# Patient Record
Sex: FEMALE | Race: WHITE | NOT HISPANIC OR LATINO | URBAN - METROPOLITAN AREA
[De-identification: names, ages, dates, MRNs, and addresses within clinical notes are randomized per-mention and may not be internally consistent; named-entity substitution may affect disease eponyms.]

---

## 2017-05-23 ENCOUNTER — IMPORTED ENCOUNTER (OUTPATIENT)
Dept: URBAN - METROPOLITAN AREA CLINIC 31 | Facility: CLINIC | Age: 72
End: 2017-05-23

## 2017-05-23 PROBLEM — H25.813: Noted: 2017-05-23

## 2017-05-23 PROCEDURE — 92004 COMPRE OPH EXAM NEW PT 1/>: CPT

## 2017-05-23 PROCEDURE — 92015 DETERMINE REFRACTIVE STATE: CPT

## 2017-05-23 NOTE — PATIENT DISCUSSION
1.  Cataract consult with Dr. Kwadwo Pettit. Advised to stop RGP wear 4 weeks before cataract evaluation and admit with Dr. Kwadwo Pettit. Has successfully worn multifocal contact lenses and may be good candidate for multifocal IOLs. 2. Refractive error - Good fit with present MF CLs but unable to increase VA with over refraction due to significant cataract changes. 3. Return for an appointment in 2 months for cataract evaluation. with Dr. Raoul Marcelo.

## 2017-06-21 ENCOUNTER — IMPORTED ENCOUNTER (OUTPATIENT)
Dept: URBAN - METROPOLITAN AREA CLINIC 31 | Facility: CLINIC | Age: 72
End: 2017-06-21

## 2017-06-21 PROBLEM — H25.13: Noted: 2017-06-21

## 2017-06-21 PROCEDURE — 99214 OFFICE O/P EST MOD 30 MIN: CPT

## 2017-06-21 NOTE — PATIENT DISCUSSION
Discussed the risks benefits alternatives and limitations of cataract surgery including infection bleeding loss of vision retinal tears detachment. The patient stated a full understanding and a desire to proceed with the procedure in both eyes. Refractive options were reviewed. Pt has elected MFIOL with Femto assist and ORA guidance to optimize lens power choice. The pt may still need glasses to possibly fine tune uncorrected vision. The possibility of glare and halo were reviewed with patient. RGP MFCL user. DC CL for 3 weeks prior to admit. Marcos admit to make sure molding has resolved. May need toric MFIOL Schedule KPE/IOL OD/OS discussed density of lens and po edema.

## 2017-07-19 ENCOUNTER — IMPORTED ENCOUNTER (OUTPATIENT)
Dept: URBAN - METROPOLITAN AREA CLINIC 31 | Facility: CLINIC | Age: 72
End: 2017-07-19

## 2017-07-19 PROBLEM — H25.13: Noted: 2017-07-19

## 2017-07-19 PROCEDURE — 76519 ECHO EXAM OF EYE: CPT

## 2017-07-19 PROCEDURE — 92134 CPTRZ OPH DX IMG PST SGM RTA: CPT

## 2017-07-19 PROCEDURE — 92286 ANT SGM IMG I&R SPECLR MIC: CPT

## 2017-07-19 NOTE — PATIENT DISCUSSION
Discussed the risks benefits alternatives and limitations of cataract surgery including infection bleeding loss of vision retinal tears detachment. The patient stated a full understanding and a desire to proceed with the procedure in both eyes. Refractive options were reviewed. Pt has elected MFIOL with Femto assist and ORA guidance to optimize lens power choice. The pt may still need glasses to possibly fine tune uncorrected vision. The possibilty of glare and halo were reviewed with patient.

## 2017-08-01 ENCOUNTER — IMPORTED ENCOUNTER (OUTPATIENT)
Dept: URBAN - METROPOLITAN AREA CLINIC 31 | Facility: CLINIC | Age: 72
End: 2017-08-01

## 2017-08-01 PROBLEM — Z96.1: Noted: 2017-08-01

## 2017-08-01 PROCEDURE — 99024 POSTOP FOLLOW-UP VISIT: CPT

## 2017-08-01 NOTE — PATIENT DISCUSSION
Post-Op Day #1 - Cataract Surgery Right Eye (OD) - ReStor with CRI explained the issue with ordering Toric implant with  representative not bringing implant to surgery center and unavailable at any local center. Pt had 1D of astigmatism that typically is treated with CRI as well as with toric. I elected to proceed with laser CRI and standard ReStor implant. Tears prn. Continue postop drops as directed. Call office with symptoms of pain redness or decreased vision in operative eye. 1 drop zylet instilled. Pt has moderate edema OD will recheck on Friday if edema has not cleared fully central would hold on surgery OS for a week. Return for an appointment in 3 days for post op exam. with Dr. Camilla Crigler.

## 2017-08-08 ENCOUNTER — IMPORTED ENCOUNTER (OUTPATIENT)
Dept: URBAN - METROPOLITAN AREA CLINIC 31 | Facility: CLINIC | Age: 72
End: 2017-08-08

## 2017-08-08 PROBLEM — Z96.1: Noted: 2017-08-08

## 2017-08-08 PROCEDURE — 99024 POSTOP FOLLOW-UP VISIT: CPT

## 2017-08-08 NOTE — PATIENT DISCUSSION
1.  Post-Op Week #1 - Cataract Surgery Right Eye (OD) - Mild edema and surface affecting uncorrected vision. Finish postop drops as directed. Reassured pt should clear over next 1-2 weeks. 2. Post-Op Day #1 - Cataract Surgery Left Eye (OS) - Elevated IOP pt with pressure feeling. Tears prn. Continue postop drops as directed. Call office with symptoms of pain redness or decreased vision in operative eye. Aqueous released paracentesis. Besivance instilled. Add lumigan qhs for a week. Discussed probable effect of using Viscoat to protect cornea with OS3. Return for an appointment in 1 week for post op refraction. with Dr. Dylan Jones.

## 2017-08-15 ENCOUNTER — IMPORTED ENCOUNTER (OUTPATIENT)
Dept: URBAN - METROPOLITAN AREA CLINIC 31 | Facility: CLINIC | Age: 72
End: 2017-08-15

## 2017-08-15 PROBLEM — Z96.1: Noted: 2017-08-15

## 2017-08-15 PROCEDURE — 99024 POSTOP FOLLOW-UP VISIT: CPT

## 2017-08-15 NOTE — PATIENT DISCUSSION
1.  Post-Op Week #2 -  Cataract Surgery Right Eye (OD) - Intraocular lens stable and surgery very well healed. Patient to resume all normal activities. Finish postop drops as directed. Final Refraction given if necessary. 2. Post-Op Week #1 - Cataract Surgery Left Eye (OS) -  Intraocular lens stable and surgery very well healed. Patient to resume all normal activities. Finish postop drops as directed. Final Refraction given if necessary. 3. Return for an appointment in 1 month for post op exam. MRx. with Dr. Brandin Aguayo.

## 2017-09-15 ENCOUNTER — IMPORTED ENCOUNTER (OUTPATIENT)
Dept: URBAN - METROPOLITAN AREA CLINIC 31 | Facility: CLINIC | Age: 72
End: 2017-09-15

## 2017-09-15 PROBLEM — Z96.1: Noted: 2017-09-15

## 2017-09-15 PROBLEM — Z98.89: Noted: 2017-09-15

## 2017-09-15 PROCEDURE — 99024 POSTOP FOLLOW-UP VISIT: CPT

## 2017-09-15 NOTE — PATIENT DISCUSSION
Post Operative:  Doing well po drops as instructed tears prn. Call with any problems. Return for an appointment in 2 months for post op exam. MRx and Topography. with Dr. Marcy Morrison.

## 2017-11-28 ENCOUNTER — IMPORTED ENCOUNTER (OUTPATIENT)
Dept: URBAN - METROPOLITAN AREA CLINIC 31 | Facility: CLINIC | Age: 72
End: 2017-11-28

## 2017-11-28 PROBLEM — H02.831: Noted: 2017-11-28

## 2017-11-28 PROBLEM — Z96.1: Noted: 2017-11-28

## 2017-11-28 PROBLEM — H02.834: Noted: 2017-11-28

## 2017-11-28 PROCEDURE — 99213 OFFICE O/P EST LOW 20 MIN: CPT

## 2017-11-28 PROCEDURE — 92025 CPTRIZED CORNEAL TOPOGRAPHY: CPT

## 2017-11-28 NOTE — PATIENT DISCUSSION
1.  Pseudophakia OU - IOLs stable. Monitor. doing well small residual astigmatism OD. Risks and benefits of [address] OD discussed. Schedule OD N/C valium 5 mg2. Dermatochalasis OU:  Refer to Oculoplastics specialist. 3.  Return for an appointment for [address] video paperwork. with Dr. Dylan Jones.

## 2018-01-24 ENCOUNTER — IMPORTED ENCOUNTER (OUTPATIENT)
Dept: URBAN - METROPOLITAN AREA CLINIC 31 | Facility: CLINIC | Age: 73
End: 2018-01-24

## 2018-08-17 ENCOUNTER — IMPORTED ENCOUNTER (OUTPATIENT)
Dept: URBAN - METROPOLITAN AREA CLINIC 31 | Facility: CLINIC | Age: 73
End: 2018-08-17

## 2018-08-17 PROBLEM — H04.123: Noted: 2018-08-17

## 2018-08-17 PROBLEM — Z96.1: Noted: 2018-08-17

## 2018-08-17 PROCEDURE — 92014 COMPRE OPH EXAM EST PT 1/>: CPT

## 2018-08-17 NOTE — PATIENT DISCUSSION
1.  Pseudophakia OU - IOLs stable. Monitor. Discussed possible refractive correction to improve distance OD. Patient will decide and call to schedule. 2. Dry Eye OU:  Continue current management with Artificial Tears. 3. 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 Does not want to see Dr. Chica Chowdary. Recommended second opinion with Dr. Suraj Hurtado. 3.  Return for an appointment in 1 year for comprehensive exam with Dr. Lamin Kelly.

## 2018-12-06 NOTE — PATIENT DISCUSSION
[facility] AMD dad and [address] [facility],  patient can continue lutein.

## 2019-05-01 ENCOUNTER — IMPORTED ENCOUNTER (OUTPATIENT)
Dept: URBAN - METROPOLITAN AREA CLINIC 31 | Facility: CLINIC | Age: 74
End: 2019-05-01

## 2019-05-01 PROBLEM — H04.123: Noted: 2019-05-01

## 2019-05-01 PROBLEM — Z96.1: Noted: 2019-05-01

## 2019-05-01 PROCEDURE — 99213 OFFICE O/P EST LOW 20 MIN: CPT

## 2019-05-01 PROCEDURE — 92015 DETERMINE REFRACTIVE STATE: CPT

## 2019-05-01 NOTE — PATIENT DISCUSSION
1.  Pseudophakia OU - IOLs stable. Monitor. 2.  Dry Eye OU:  Continue current management with Artificial Tears. 3.   Refractive consult OD with FP since last consult 6 months ago

## 2019-07-02 ENCOUNTER — IMPORTED ENCOUNTER (OUTPATIENT)
Dept: URBAN - METROPOLITAN AREA CLINIC 31 | Facility: CLINIC | Age: 74
End: 2019-07-02

## 2019-07-02 PROBLEM — Z96.1: Noted: 2019-07-02

## 2019-07-02 PROBLEM — H52.209: Noted: 2019-07-02

## 2019-07-02 PROCEDURE — 92025 CPTRIZED CORNEAL TOPOGRAPHY: CPT

## 2019-07-02 NOTE — PATIENT DISCUSSION
1.  We discussed risks vs. benefits of surgery. The patient has watched and understands the informed consent video. I have discussed the risks of laser refractive surgery with the patient. I informed the patient of the risk of infection (1:1000 for PRK and 1:3000)    I have discussed the possibility of postoperative halos starbursts and dryness. I reviewed the risk of corneal ectasia. We discussed the possibility of need for enhancement surgery. I have discussed the possibility for the patient to require glasses and/or contact lenses postoperatively for the clearest vision possible. I have answered all of the patients questions. Schedule PRK OD distance2. Pseudophakia OU - IOLs stable. Monitor.  MFIOL OU

## 2019-07-26 ENCOUNTER — IMPORTED ENCOUNTER (OUTPATIENT)
Dept: URBAN - METROPOLITAN AREA CLINIC 31 | Facility: CLINIC | Age: 74
End: 2019-07-26

## 2019-07-26 PROCEDURE — 66999 UNLISTED PX ANT SEGMENT EYE: CPT

## 2019-07-27 ENCOUNTER — IMPORTED ENCOUNTER (OUTPATIENT)
Dept: URBAN - METROPOLITAN AREA CLINIC 31 | Facility: CLINIC | Age: 74
End: 2019-07-27

## 2019-07-27 PROBLEM — H52.229: Noted: 2019-07-27

## 2019-07-27 PROCEDURE — 99024 POSTOP FOLLOW-UP VISIT: CPT

## 2019-07-27 NOTE — PATIENT DISCUSSION
s/p [address] doing well. PO drops as directed on instruction sheet. BCL to remain until surface healed tears prn. Call with any problems. Return for an appointment in 4 days for post op exam. with Dr. Sapna Andrade.

## 2019-07-31 ENCOUNTER — IMPORTED ENCOUNTER (OUTPATIENT)
Dept: URBAN - METROPOLITAN AREA CLINIC 31 | Facility: CLINIC | Age: 74
End: 2019-07-31

## 2019-07-31 PROCEDURE — 99024 POSTOP FOLLOW-UP VISIT: CPT

## 2019-07-31 NOTE — PATIENT DISCUSSION
s/p [address] doing well. BCL DC DC prolensa vigamox until Friday then dc Lotemax 4x/d until Friday then 2x/d.  tears prn. Call with any problems. Return for an appointment in 2 weeks for post op refraction. with Dr. Bridget Molina.

## 2019-08-23 ENCOUNTER — IMPORTED ENCOUNTER (OUTPATIENT)
Dept: URBAN - METROPOLITAN AREA CLINIC 31 | Facility: CLINIC | Age: 74
End: 2019-08-23

## 2019-08-23 PROCEDURE — 99024 POSTOP FOLLOW-UP VISIT: CPT

## 2019-08-23 NOTE — PATIENT DISCUSSION
s/p [address] doing well. tears 2-3x/d  Call with any problems. Return for an appointment in 3 months for office call. with Dr. Gutierrez Aguirre

## 2019-11-19 ENCOUNTER — IMPORTED ENCOUNTER (OUTPATIENT)
Dept: URBAN - METROPOLITAN AREA CLINIC 31 | Facility: CLINIC | Age: 74
End: 2019-11-19

## 2019-11-19 PROBLEM — Z96.1: Noted: 2019-11-19

## 2019-11-19 PROBLEM — H26.493: Noted: 2019-11-19

## 2019-11-19 PROBLEM — H17.89: Noted: 2019-11-19

## 2019-11-19 PROCEDURE — 99213 OFFICE O/P EST LOW 20 MIN: CPT

## 2019-11-19 NOTE — PATIENT DISCUSSION
1.  PCO OU: (Posterior Capsule Opacification)  Not visually significant at this time. Monitor for yag capsulotomy necessity. 2. Pseudophakia OU - IOLs stable. Monitor. 3.  s/p PRK OD healed and stable. Return for an appointment in 12 months for comprehensive exam. with Dr. Melissa Jain.

## 2019-11-19 NOTE — PATIENT DISCUSSION
s/p PRK OD healed and stable. Return for an appointment in 12 months for comprehensive exam. with Dr. Gloria Romero.

## 2019-12-09 NOTE — PATIENT DISCUSSION
[facility] AMD dad and [address] [facility],  patient can continue lutein.

## 2020-09-24 ENCOUNTER — IMPORTED ENCOUNTER (OUTPATIENT)
Dept: URBAN - METROPOLITAN AREA CLINIC 31 | Facility: CLINIC | Age: 75
End: 2020-09-24

## 2020-09-24 PROBLEM — H26.493: Noted: 2020-09-24

## 2020-09-24 PROBLEM — Z96.1: Noted: 2020-09-24

## 2020-09-24 PROBLEM — H17.89: Noted: 2020-09-24

## 2020-09-24 PROCEDURE — 92014 COMPRE OPH EXAM EST PT 1/>: CPT

## 2020-09-24 NOTE — PATIENT DISCUSSION
1.  PCO OU: (Posterior Capsule Opacification)  Not visually significant at this time. Monitor for yag capsulotomy necessity. 2.  s/p PRK happy with vision3. Doing well with MFIOL. Monitor for changes in vision. 4. Return for an appointment in 12 months for comprehensive exam. with Dr. Himanshu Aguilera.

## 2020-12-10 NOTE — PATIENT DISCUSSION
[facility] AMD dad and [address] [facility],  patient can continue lutein.

## 2021-08-26 ENCOUNTER — IMPORTED ENCOUNTER (OUTPATIENT)
Dept: URBAN - METROPOLITAN AREA CLINIC 31 | Facility: CLINIC | Age: 76
End: 2021-08-26

## 2021-08-26 PROBLEM — Z96.1: Noted: 2021-08-26

## 2021-08-26 PROBLEM — H26.493: Noted: 2021-08-26

## 2021-08-26 PROBLEM — Z98.890: Noted: 2021-08-26

## 2021-08-26 PROBLEM — H17.89: Noted: 2021-08-26

## 2021-08-26 PROCEDURE — 92014 COMPRE OPH EXAM EST PT 1/>: CPT

## 2021-08-26 NOTE — PATIENT DISCUSSION
1.  PCO OU: (Posterior Capsule Opacification)  Not visually significant at this time. Monitor for yag capsulotomy necessity. 2. Doing well with MFIOL. Monitor for changes in vision. 3.  s/p PRK stable OD4. Return for an appointment in 12 months for comprehensive exam. with Dr. Gloria Romero.

## 2021-10-28 NOTE — PATIENT DISCUSSION
[facility] AMD dad and [address] [facility],  patient can continue lutein.

## 2021-10-29 NOTE — PATIENT DISCUSSION
[facility] AMD dad and [address] [facility],  patient can continue lutein. Protopic Counseling: Patient may experience a mild burning sensation during topical application. Protopic is not approved in children less than 2 years of age. There have been case reports of hematologic and skin malignancies in patients using topical calcineurin inhibitors although causality is questionable.

## 2022-04-02 ASSESSMENT — VISUAL ACUITY
OD_PH: SC 20/25 -3
OD_CC: 20/40-1
OD_PH: SC 20/60 +2
OS_CC: 20/25-2
OD_CC: 20/20
OD_CC: 20/30+2
OD_CC: 20/40-1
OS_SC: 20/25
OS_SC: J1+
OD_CC: 20/25-3
OD_CC: 20/20
OD_SC: 20/20-1
OS_CC: 20/20
OD_CC: 20/25+2
OD_SC: 20/60
OS_SC: 20/40+2
OS_CC: 20/20-1
OS_SC: 20/20-1
OD_CC: 20/30+1
OD_SC: 20/20
OD_SC: J1+
OD_SC: 20/25
OS_SC: 20/25
OD_CC: 20/70-2
OD_SC: 20/60
OS_CC: 20/20
OD_CC: 20/30
OD_SC: 20/400
OS_CC: 20/20
OS_CC: 20/25-2
OD_PH: SC 20/80
OS_SC: 20/40+2
OS_SC: 20/20-1
OD_SC: 20/25
OS_CC: 20/40
OD_CC: 20/200
OS_SC: 20/20
OS_CC: 20/20
OD_CC: 20/50+2
OS_CC: 20/25-2
OU_CC: 20/20
OS_SC: J1+
OS_GLARE: 20/70-1
OU_SC: 20/2016''
OS_CC: 20/20-1
OU_SC: J1+
OS_CC: 20/40
OU_CC: 20/2016''
OS_SC: 20/20
OD_SC: 20/25
OD_CC: 20/25+2
OS_SC: 20/20-2
OS_CC: 20/20
OU_SC: 20/20-1
OD_SC: J1
OS_CC: 20/40-2
OD_SC: 20/20
OU_CC: 20/20-1
OD_SC: 20/30

## 2022-04-02 ASSESSMENT — TONOMETRY
OS_IOP_MMHG: 14
OS_IOP_MMHG: 12
OS_IOP_MMHG: 38
OD_IOP_MMHG: 13
OS_IOP_MMHG: 12
OD_IOP_MMHG: 14
OD_IOP_MMHG: 17
OD_IOP_MMHG: 14
OD_IOP_MMHG: 13
OD_IOP_MMHG: 16
OD_IOP_MMHG: 15
OS_IOP_MMHG: 12
OS_IOP_MMHG: 14
OS_IOP_MMHG: 13
OS_IOP_MMHG: 15
OS_IOP_MMHG: 12
OD_IOP_MMHG: 24
OD_IOP_MMHG: 16
OD_IOP_MMHG: 12
OS_IOP_MMHG: 16

## 2022-08-30 ENCOUNTER — ESTABLISHED PATIENT (OUTPATIENT)
Dept: URBAN - METROPOLITAN AREA CLINIC 29 | Facility: CLINIC | Age: 77
End: 2022-08-30

## 2022-08-30 DIAGNOSIS — H04.123: ICD-10-CM

## 2022-08-30 DIAGNOSIS — H26.493: ICD-10-CM

## 2022-08-30 DIAGNOSIS — Z96.1: ICD-10-CM

## 2022-08-30 PROCEDURE — 92014 COMPRE OPH EXAM EST PT 1/>: CPT

## 2022-08-30 ASSESSMENT — VISUAL ACUITY
OS_SC: 20/30+1
OD_SC: 20/25
OS_SC: 20/20-1
OD_SC: 20/25-2

## 2022-08-30 ASSESSMENT — TONOMETRY
OD_IOP_MMHG: 14
OS_IOP_MMHG: 13

## 2023-09-26 ENCOUNTER — COMPREHENSIVE EXAM (OUTPATIENT)
Dept: URBAN - METROPOLITAN AREA CLINIC 29 | Facility: CLINIC | Age: 78
End: 2023-09-26

## 2023-09-26 DIAGNOSIS — Z96.1: ICD-10-CM

## 2023-09-26 DIAGNOSIS — H43.813: ICD-10-CM

## 2023-09-26 PROCEDURE — 92014 COMPRE OPH EXAM EST PT 1/>: CPT

## 2023-09-26 ASSESSMENT — TONOMETRY
OS_IOP_MMHG: 14
OD_IOP_MMHG: 14

## 2023-09-26 ASSESSMENT — VISUAL ACUITY
OD_SC: 20/20
OS_SC: 20/20
OD_SC: 20/20
OS_SC: 20/20-2

## 2023-12-12 ENCOUNTER — OFFICE VISIT (OUTPATIENT)
Dept: URBAN - METROPOLITAN AREA CLINIC 66 | Facility: CLINIC | Age: 78
End: 2023-12-12
Payer: MEDICARE

## 2023-12-12 ENCOUNTER — LAB OUTSIDE AN ENCOUNTER (OUTPATIENT)
Dept: URBAN - METROPOLITAN AREA CLINIC 66 | Facility: CLINIC | Age: 78
End: 2023-12-12

## 2023-12-12 VITALS
HEART RATE: 93 BPM | HEIGHT: 63 IN | OXYGEN SATURATION: 98 % | BODY MASS INDEX: 24.1 KG/M2 | WEIGHT: 136 LBS | SYSTOLIC BLOOD PRESSURE: 126 MMHG | DIASTOLIC BLOOD PRESSURE: 84 MMHG

## 2023-12-12 DIAGNOSIS — R19.5 OCCULT BLOOD POSITIVE STOOL: ICD-10-CM

## 2023-12-12 DIAGNOSIS — R10.13 EPIGASTRIC PAIN: ICD-10-CM

## 2023-12-12 PROCEDURE — 99204 OFFICE O/P NEW MOD 45 MIN: CPT | Performed by: INTERNAL MEDICINE

## 2023-12-12 RX ORDER — OMEPRAZOLE 40 MG/1
TAKE ONE CAPSULE BY MOUTH EVERY EVENING CAPSULE, DELAYED RELEASE ORAL
Qty: 30 UNSPECIFIED | Refills: 0 | Status: ACTIVE | COMMUNITY

## 2023-12-12 RX ORDER — AMLODIPINE BESYLATE 2.5 MG/1
TAKE ONE TABLET BY MOUTH ONE TIME DAILY TABLET ORAL
Qty: 90 UNSPECIFIED | Refills: 0 | Status: ACTIVE | COMMUNITY

## 2023-12-12 RX ORDER — ROSUVASTATIN CALCIUM 5 MG/1
TAKE ONE TABLET BY MOUTH ONE TIME DAILY TABLET, FILM COATED ORAL
Qty: 90 UNSPECIFIED | Refills: 0 | Status: ACTIVE | COMMUNITY

## 2023-12-12 RX ORDER — LOSARTAN POTASSIUM 100 MG/1
TAKE ONE TABLET BY MOUTH ONE TIME DAILY TABLET, FILM COATED ORAL
Qty: 90 UNSPECIFIED | Refills: 0 | Status: ACTIVE | COMMUNITY

## 2023-12-12 RX ORDER — ANASTROZOLE 1 MG/1
TAKE ONE TABLET BY MOUTH ONE TIME DAILY TABLET ORAL
Qty: 90 UNSPECIFIED | Refills: 1 | Status: ACTIVE | COMMUNITY

## 2023-12-12 RX ORDER — METHOTREXATE 2.5 MG/1
TAKE EIGHT TABLETS BY MOUTH EVERY WEEK FOR 12 WEEKS TABLET ORAL
Qty: 97 UNSPECIFIED | Refills: 0 | Status: ACTIVE | COMMUNITY

## 2023-12-12 NOTE — HPI-TODAY'S VISIT:
Patient evaluated due to epigastric pain. Referred having intermittent episodes of epigastric pain for the last 4-6 weeks. Referred was started on pepcid and omeprezole without major improvement. Had recent FOBT that was positive. Denies nausea, vomits, dysphagia, odynophagia, heartburn, diarrhea, constipation GI bleeding or weight loss

## 2023-12-20 ENCOUNTER — OUT OF OFFICE VISIT (OUTPATIENT)
Dept: URBAN - METROPOLITAN AREA SURGERY CENTER 12 | Facility: SURGERY CENTER | Age: 78
End: 2023-12-20
Payer: MEDICARE

## 2023-12-20 ENCOUNTER — CLAIMS CREATED FROM THE CLAIM WINDOW (OUTPATIENT)
Dept: URBAN - METROPOLITAN AREA CLINIC 4 | Facility: CLINIC | Age: 78
End: 2023-12-20
Payer: MEDICARE

## 2023-12-20 DIAGNOSIS — R10.13 EPIGASTRIC ABDOMINAL PAIN: ICD-10-CM

## 2023-12-20 DIAGNOSIS — K29.70 GASTRITIS: ICD-10-CM

## 2023-12-20 DIAGNOSIS — R19.5 OCCULT BLOOD +: ICD-10-CM

## 2023-12-20 DIAGNOSIS — K29.70 GASTRITIS WITHOUT BLEEDING, UNSPECIFIED CHRONICITY, UNSPECIFIED GASTRITIS TYPE: ICD-10-CM

## 2023-12-20 DIAGNOSIS — K31.89 OTHER DISEASES OF STOMACH AND DUODENUM: ICD-10-CM

## 2023-12-20 PROCEDURE — 43239 EGD BIOPSY SINGLE/MULTIPLE: CPT | Performed by: INTERNAL MEDICINE

## 2023-12-20 PROCEDURE — 00731 ANES UPR GI NDSC PX NOS: CPT | Performed by: NURSE ANESTHETIST, CERTIFIED REGISTERED

## 2023-12-20 PROCEDURE — 88305 TISSUE EXAM BY PATHOLOGIST: CPT | Performed by: PATHOLOGY

## 2023-12-20 PROCEDURE — 43239 EGD BIOPSY SINGLE/MULTIPLE: CPT | Performed by: CLINIC/CENTER

## 2023-12-20 RX ORDER — ROSUVASTATIN CALCIUM 5 MG/1
TAKE ONE TABLET BY MOUTH ONE TIME DAILY TABLET, FILM COATED ORAL
Qty: 90 UNSPECIFIED | Refills: 0 | Status: ACTIVE | COMMUNITY

## 2023-12-20 RX ORDER — AMLODIPINE BESYLATE 2.5 MG/1
TAKE ONE TABLET BY MOUTH ONE TIME DAILY TABLET ORAL
Qty: 90 UNSPECIFIED | Refills: 0 | Status: ACTIVE | COMMUNITY

## 2023-12-20 RX ORDER — ANASTROZOLE 1 MG/1
TAKE ONE TABLET BY MOUTH ONE TIME DAILY TABLET ORAL
Qty: 90 UNSPECIFIED | Refills: 1 | Status: ACTIVE | COMMUNITY

## 2023-12-20 RX ORDER — METHOTREXATE 2.5 MG/1
TAKE EIGHT TABLETS BY MOUTH EVERY WEEK FOR 12 WEEKS TABLET ORAL
Qty: 97 UNSPECIFIED | Refills: 0 | Status: ACTIVE | COMMUNITY

## 2023-12-20 RX ORDER — OMEPRAZOLE 40 MG/1
TAKE ONE CAPSULE BY MOUTH EVERY EVENING CAPSULE, DELAYED RELEASE ORAL
Qty: 30 UNSPECIFIED | Refills: 0 | Status: ACTIVE | COMMUNITY

## 2023-12-20 RX ORDER — LOSARTAN POTASSIUM 100 MG/1
TAKE ONE TABLET BY MOUTH ONE TIME DAILY TABLET, FILM COATED ORAL
Qty: 90 UNSPECIFIED | Refills: 0 | Status: ACTIVE | COMMUNITY

## 2024-01-04 ENCOUNTER — OFFICE VISIT (OUTPATIENT)
Dept: URBAN - METROPOLITAN AREA CLINIC 66 | Facility: CLINIC | Age: 79
End: 2024-01-04
Payer: MEDICARE

## 2024-01-04 ENCOUNTER — TELEPHONE ENCOUNTER (OUTPATIENT)
Dept: URBAN - METROPOLITAN AREA CLINIC 66 | Facility: CLINIC | Age: 79
End: 2024-01-04

## 2024-01-04 ENCOUNTER — LAB OUTSIDE AN ENCOUNTER (OUTPATIENT)
Dept: URBAN - METROPOLITAN AREA CLINIC 66 | Facility: CLINIC | Age: 79
End: 2024-01-04

## 2024-01-04 VITALS — OXYGEN SATURATION: 97 % | HEART RATE: 97 BPM | BODY MASS INDEX: 24.1 KG/M2 | WEIGHT: 136 LBS | HEIGHT: 63 IN

## 2024-01-04 DIAGNOSIS — K29.30 CHRONIC SUPERFICIAL GASTRITIS WITHOUT BLEEDING: ICD-10-CM

## 2024-01-04 DIAGNOSIS — R19.5 OCCULT BLOOD POSITIVE STOOL: ICD-10-CM

## 2024-01-04 DIAGNOSIS — R10.13 EPIGASTRIC PAIN: ICD-10-CM

## 2024-01-04 PROBLEM — 59614000: Status: ACTIVE | Noted: 2023-12-12

## 2024-01-04 PROBLEM — 79922009: Status: ACTIVE | Noted: 2023-12-12

## 2024-01-04 PROCEDURE — 99214 OFFICE O/P EST MOD 30 MIN: CPT

## 2024-01-04 RX ORDER — SODIUM PICOSULFATE, MAGNESIUM OXIDE, AND ANHYDROUS CITRIC ACID 12; 3.5; 1 G/175ML; G/175ML; MG/175ML
175 ML THE FIRST DOSE THE EVENING BEFORE AND SECOND DOSE THE MORNING OF COLONOSCOPY LIQUID ORAL ONCE A DAY
Qty: 350 | OUTPATIENT
Start: 2024-01-04 | End: 2024-01-06

## 2024-01-04 RX ORDER — ANASTROZOLE 1 MG/1
TAKE ONE TABLET BY MOUTH ONE TIME DAILY TABLET ORAL
Qty: 90 UNSPECIFIED | Refills: 1 | Status: ACTIVE | COMMUNITY

## 2024-01-04 RX ORDER — OMEPRAZOLE 40 MG/1
TAKE ONE CAPSULE BY MOUTH EVERY EVENING CAPSULE, DELAYED RELEASE ORAL
Qty: 30 UNSPECIFIED | Refills: 0 | Status: ACTIVE | COMMUNITY

## 2024-01-04 RX ORDER — LOSARTAN POTASSIUM 100 MG/1
TAKE ONE TABLET BY MOUTH ONE TIME DAILY TABLET, FILM COATED ORAL
Qty: 90 UNSPECIFIED | Refills: 0 | Status: ACTIVE | COMMUNITY

## 2024-01-04 RX ORDER — METHOTREXATE 2.5 MG/1
TAKE EIGHT TABLETS BY MOUTH EVERY WEEK FOR 12 WEEKS TABLET ORAL
Qty: 97 UNSPECIFIED | Refills: 0 | Status: ACTIVE | COMMUNITY

## 2024-01-04 RX ORDER — ROSUVASTATIN CALCIUM 5 MG/1
TAKE ONE TABLET BY MOUTH ONE TIME DAILY TABLET, FILM COATED ORAL
Qty: 90 UNSPECIFIED | Refills: 0 | Status: ACTIVE | COMMUNITY

## 2024-01-04 RX ORDER — AMLODIPINE BESYLATE 2.5 MG/1
TAKE ONE TABLET BY MOUTH ONE TIME DAILY TABLET ORAL
Qty: 90 UNSPECIFIED | Refills: 0 | Status: ACTIVE | COMMUNITY

## 2024-01-04 NOTE — HPI-TODAY'S VISIT:
Patient presents to review EGD and pathology results. Has upcoming abdominal US. Evaluated due to intermittent epigastric pain x6 weeks and was started on pepcid and omeprezole without major improvement. Refers today that pain resolved after discontinuing oral Methotrexate. Had recent FOBT that was positive and labs with decreased hgb and iron. Refers recently started on oral iron tx. Denies nausea, vomiting, fatigue, SOB, dysphagia, odynophagia, heartburn, abdominal pain, diarrhea, constipation GI bleeding or weight loss.

## 2024-01-08 ENCOUNTER — TELEPHONE ENCOUNTER (OUTPATIENT)
Dept: URBAN - METROPOLITAN AREA CLINIC 66 | Facility: CLINIC | Age: 79
End: 2024-01-08

## 2024-01-08 ENCOUNTER — OFFICE VISIT (OUTPATIENT)
Dept: URBAN - METROPOLITAN AREA CLINIC 65 | Facility: CLINIC | Age: 79
End: 2024-01-08
Payer: MEDICARE

## 2024-01-08 DIAGNOSIS — N28.1 CYST OF KIDNEY, ACQUIRED: ICD-10-CM

## 2024-01-08 DIAGNOSIS — R10.13 EPIGASTRIC PAIN: ICD-10-CM

## 2024-01-08 PROBLEM — 2901004: Status: ACTIVE | Noted: 2024-01-08

## 2024-01-08 PROCEDURE — 93976 VASCULAR STUDY: CPT

## 2024-01-08 PROCEDURE — 76705 ECHO EXAM OF ABDOMEN: CPT

## 2024-01-08 RX ORDER — METHOTREXATE 2.5 MG/1
TAKE EIGHT TABLETS BY MOUTH EVERY WEEK FOR 12 WEEKS TABLET ORAL
Qty: 97 UNSPECIFIED | Refills: 0 | Status: ACTIVE | COMMUNITY

## 2024-01-08 RX ORDER — ANASTROZOLE 1 MG/1
TAKE ONE TABLET BY MOUTH ONE TIME DAILY TABLET ORAL
Qty: 90 UNSPECIFIED | Refills: 1 | Status: ACTIVE | COMMUNITY

## 2024-01-08 RX ORDER — SODIUM PICOSULFATE, MAGNESIUM OXIDE, AND ANHYDROUS CITRIC ACID 12; 3.5; 1 G/175ML; G/175ML; MG/175ML
175 ML THE FIRST DOSE THE EVENING BEFORE AND SECOND DOSE THE MORNING OF COLONOSCOPY LIQUID ORAL ONCE A DAY
Qty: 350 ML | Refills: 0 | OUTPATIENT
Start: 2024-01-09 | End: 2024-01-11

## 2024-01-08 RX ORDER — LOSARTAN POTASSIUM 100 MG/1
TAKE ONE TABLET BY MOUTH ONE TIME DAILY TABLET, FILM COATED ORAL
Qty: 90 UNSPECIFIED | Refills: 0 | Status: ACTIVE | COMMUNITY

## 2024-01-08 RX ORDER — OMEPRAZOLE 40 MG/1
TAKE ONE CAPSULE BY MOUTH EVERY EVENING CAPSULE, DELAYED RELEASE ORAL
Qty: 30 UNSPECIFIED | Refills: 0 | Status: ACTIVE | COMMUNITY

## 2024-01-08 RX ORDER — AMLODIPINE BESYLATE 2.5 MG/1
TAKE ONE TABLET BY MOUTH ONE TIME DAILY TABLET ORAL
Qty: 90 UNSPECIFIED | Refills: 0 | Status: ACTIVE | COMMUNITY

## 2024-01-08 RX ORDER — ROSUVASTATIN CALCIUM 5 MG/1
TAKE ONE TABLET BY MOUTH ONE TIME DAILY TABLET, FILM COATED ORAL
Qty: 90 UNSPECIFIED | Refills: 0 | Status: ACTIVE | COMMUNITY

## 2024-01-10 ENCOUNTER — OFFICE VISIT (OUTPATIENT)
Dept: URBAN - METROPOLITAN AREA SURGERY CENTER 12 | Facility: SURGERY CENTER | Age: 79
End: 2024-01-10

## 2024-01-17 ENCOUNTER — OFFICE VISIT (OUTPATIENT)
Dept: URBAN - METROPOLITAN AREA CLINIC 65 | Facility: CLINIC | Age: 79
End: 2024-01-17

## 2024-01-17 ENCOUNTER — CLAIMS CREATED FROM THE CLAIM WINDOW (OUTPATIENT)
Dept: URBAN - METROPOLITAN AREA SURGERY CENTER 12 | Facility: SURGERY CENTER | Age: 79
End: 2024-01-17
Payer: MEDICARE

## 2024-01-17 ENCOUNTER — CLAIMS CREATED FROM THE CLAIM WINDOW (OUTPATIENT)
Dept: URBAN - METROPOLITAN AREA CLINIC 4 | Facility: CLINIC | Age: 79
End: 2024-01-17
Payer: MEDICARE

## 2024-01-17 DIAGNOSIS — K64.8 OTHER HEMORRHOIDS: ICD-10-CM

## 2024-01-17 DIAGNOSIS — Z86.010 PERSONAL HISTORY OF COLONIC POLYPS: ICD-10-CM

## 2024-01-17 DIAGNOSIS — K63.89 CECUM MASS: ICD-10-CM

## 2024-01-17 DIAGNOSIS — K63.9 CECUM MASS: ICD-10-CM

## 2024-01-17 DIAGNOSIS — Z12.11 ENCOUNTER FOR SCREENING FOR MALIGNANT NEOPLASM OF COLON: ICD-10-CM

## 2024-01-17 PROCEDURE — 00811 ANES LWR INTST NDSC NOS: CPT | Performed by: NURSE ANESTHETIST, CERTIFIED REGISTERED

## 2024-01-17 PROCEDURE — 88302 TISSUE EXAM BY PATHOLOGIST: CPT | Performed by: PATHOLOGY

## 2024-01-17 PROCEDURE — 45381 COLONOSCOPY SUBMUCOUS NJX: CPT | Performed by: INTERNAL MEDICINE

## 2024-01-17 PROCEDURE — 45381 COLONOSCOPY SUBMUCOUS NJX: CPT | Performed by: CLINIC/CENTER

## 2024-01-17 PROCEDURE — 45380 COLONOSCOPY AND BIOPSY: CPT | Performed by: INTERNAL MEDICINE

## 2024-01-17 PROCEDURE — 45380 COLONOSCOPY AND BIOPSY: CPT | Performed by: CLINIC/CENTER

## 2024-01-17 RX ORDER — METHOTREXATE 2.5 MG/1
TAKE EIGHT TABLETS BY MOUTH EVERY WEEK FOR 12 WEEKS TABLET ORAL
Qty: 97 UNSPECIFIED | Refills: 0 | Status: ACTIVE | COMMUNITY

## 2024-01-17 RX ORDER — OMEPRAZOLE 40 MG/1
TAKE ONE CAPSULE BY MOUTH EVERY EVENING CAPSULE, DELAYED RELEASE ORAL
Qty: 30 UNSPECIFIED | Refills: 0 | Status: ACTIVE | COMMUNITY

## 2024-01-17 RX ORDER — AMLODIPINE BESYLATE 2.5 MG/1
TAKE ONE TABLET BY MOUTH ONE TIME DAILY TABLET ORAL
Qty: 90 UNSPECIFIED | Refills: 0 | Status: ACTIVE | COMMUNITY

## 2024-01-17 RX ORDER — LOSARTAN POTASSIUM 100 MG/1
TAKE ONE TABLET BY MOUTH ONE TIME DAILY TABLET, FILM COATED ORAL
Qty: 90 UNSPECIFIED | Refills: 0 | Status: ACTIVE | COMMUNITY

## 2024-01-17 RX ORDER — ANASTROZOLE 1 MG/1
TAKE ONE TABLET BY MOUTH ONE TIME DAILY TABLET ORAL
Qty: 90 UNSPECIFIED | Refills: 1 | Status: ACTIVE | COMMUNITY

## 2024-01-17 RX ORDER — ROSUVASTATIN CALCIUM 5 MG/1
TAKE ONE TABLET BY MOUTH ONE TIME DAILY TABLET, FILM COATED ORAL
Qty: 90 UNSPECIFIED | Refills: 0 | Status: ACTIVE | COMMUNITY

## 2024-01-23 ENCOUNTER — LAB OUTSIDE AN ENCOUNTER (OUTPATIENT)
Dept: URBAN - METROPOLITAN AREA CLINIC 66 | Facility: CLINIC | Age: 79
End: 2024-01-23

## 2024-01-23 ENCOUNTER — DASHBOARD ENCOUNTERS (OUTPATIENT)
Age: 79
End: 2024-01-23

## 2024-01-23 ENCOUNTER — OFFICE VISIT (OUTPATIENT)
Dept: URBAN - METROPOLITAN AREA CLINIC 66 | Facility: CLINIC | Age: 79
End: 2024-01-23
Payer: MEDICARE

## 2024-01-23 ENCOUNTER — TELEPHONE ENCOUNTER (OUTPATIENT)
Dept: URBAN - METROPOLITAN AREA CLINIC 68 | Facility: CLINIC | Age: 79
End: 2024-01-23

## 2024-01-23 VITALS
OXYGEN SATURATION: 99 % | HEIGHT: 63 IN | WEIGHT: 133 LBS | DIASTOLIC BLOOD PRESSURE: 74 MMHG | SYSTOLIC BLOOD PRESSURE: 132 MMHG | HEART RATE: 90 BPM | BODY MASS INDEX: 23.57 KG/M2

## 2024-01-23 DIAGNOSIS — K29.30 CHRONIC SUPERFICIAL GASTRITIS WITHOUT BLEEDING: ICD-10-CM

## 2024-01-23 DIAGNOSIS — K63.89 MASS OF COLON: ICD-10-CM

## 2024-01-23 PROBLEM — 425711007: Status: ACTIVE | Noted: 2024-01-23

## 2024-01-23 PROCEDURE — 99214 OFFICE O/P EST MOD 30 MIN: CPT | Performed by: INTERNAL MEDICINE

## 2024-01-23 RX ORDER — ROSUVASTATIN CALCIUM 5 MG/1
TAKE ONE TABLET BY MOUTH ONE TIME DAILY TABLET, FILM COATED ORAL
Qty: 90 UNSPECIFIED | Refills: 0 | Status: ACTIVE | COMMUNITY

## 2024-01-23 RX ORDER — LOSARTAN POTASSIUM 100 MG/1
TAKE ONE TABLET BY MOUTH ONE TIME DAILY TABLET, FILM COATED ORAL
Qty: 90 UNSPECIFIED | Refills: 0 | Status: ACTIVE | COMMUNITY

## 2024-01-23 RX ORDER — AMLODIPINE BESYLATE 2.5 MG/1
TAKE ONE TABLET BY MOUTH ONE TIME DAILY TABLET ORAL
Qty: 90 UNSPECIFIED | Refills: 0 | Status: ACTIVE | COMMUNITY

## 2024-01-23 RX ORDER — METHOTREXATE 2.5 MG/1
TAKE EIGHT TABLETS BY MOUTH EVERY WEEK FOR 12 WEEKS TABLET ORAL
Qty: 97 UNSPECIFIED | Refills: 0 | Status: ACTIVE | COMMUNITY

## 2024-01-23 RX ORDER — ANASTROZOLE 1 MG/1
TAKE ONE TABLET BY MOUTH ONE TIME DAILY TABLET ORAL
Qty: 90 UNSPECIFIED | Refills: 1 | Status: ACTIVE | COMMUNITY

## 2024-01-23 RX ORDER — OMEPRAZOLE 40 MG/1
TAKE ONE CAPSULE BY MOUTH EVERY EVENING CAPSULE, DELAYED RELEASE ORAL
Qty: 30 UNSPECIFIED | Refills: 0 | Status: ACTIVE | COMMUNITY

## 2024-01-23 NOTE — HPI-TODAY'S VISIT:
Patient evaluated after having colonoscopy found with possible colonic mass. Biopsies were obtained and reported as "Fecal material only". Referred was evaluated by her oncologist and general surgery At the [facility] and PET/SCAN schedule for Saturday. Referred having intermittent episodes of diffuse abdominal discomfort.  Denies nausea, vomits, dysphagia, odynophagia, heartburn,  diarrhea, constipation GI bleeding or weight loss

## 2024-01-24 ENCOUNTER — OFFICE VISIT (OUTPATIENT)
Dept: URBAN - METROPOLITAN AREA CLINIC 66 | Facility: CLINIC | Age: 79
End: 2024-01-24

## 2024-01-31 ENCOUNTER — OFFICE VISIT (OUTPATIENT)
Dept: URBAN - METROPOLITAN AREA CLINIC 66 | Facility: CLINIC | Age: 79
End: 2024-01-31

## 2024-02-08 ENCOUNTER — COLON (OUTPATIENT)
Dept: URBAN - METROPOLITAN AREA SURGERY CENTER 12 | Facility: SURGERY CENTER | Age: 79
End: 2024-02-08

## 2024-02-20 ENCOUNTER — OV EP (OUTPATIENT)
Dept: URBAN - METROPOLITAN AREA CLINIC 66 | Facility: CLINIC | Age: 79
End: 2024-02-20

## 2024-10-23 ENCOUNTER — COMPREHENSIVE EXAM (OUTPATIENT)
Dept: URBAN - METROPOLITAN AREA CLINIC 29 | Facility: CLINIC | Age: 79
End: 2024-10-23

## 2024-10-23 DIAGNOSIS — Z96.1: ICD-10-CM

## 2024-10-23 PROCEDURE — 92014 COMPRE OPH EXAM EST PT 1/>: CPT

## 2024-12-05 ENCOUNTER — TELEPHONE ENCOUNTER (OUTPATIENT)
Dept: URBAN - METROPOLITAN AREA CLINIC 9 | Facility: CLINIC | Age: 79
End: 2024-12-05

## 2024-12-10 ENCOUNTER — OFFICE VISIT (OUTPATIENT)
Dept: URBAN - METROPOLITAN AREA CLINIC 9 | Facility: CLINIC | Age: 79
End: 2024-12-10
Payer: MEDICARE

## 2024-12-10 VITALS
DIASTOLIC BLOOD PRESSURE: 78 MMHG | SYSTOLIC BLOOD PRESSURE: 116 MMHG | BODY MASS INDEX: 23.92 KG/M2 | HEIGHT: 63 IN | WEIGHT: 135 LBS

## 2024-12-10 DIAGNOSIS — Z85.038 HISTORY OF COLON CANCER: ICD-10-CM

## 2024-12-10 PROCEDURE — 99214 OFFICE O/P EST MOD 30 MIN: CPT | Performed by: INTERNAL MEDICINE

## 2024-12-10 RX ORDER — METHOTREXATE 2.5 MG/1
TAKE EIGHT TABLETS BY MOUTH EVERY WEEK FOR 12 WEEKS TABLET ORAL
Qty: 97 UNSPECIFIED | Refills: 0 | Status: ON HOLD | COMMUNITY

## 2024-12-10 RX ORDER — SERTRALINE 100 MG/1
1 TABLET TABLET, FILM COATED ORAL ONCE A DAY
Status: ACTIVE | COMMUNITY

## 2024-12-10 RX ORDER — ANASTROZOLE 1 MG/1
TAKE ONE TABLET BY MOUTH ONE TIME DAILY TABLET ORAL
Qty: 90 UNSPECIFIED | Refills: 1 | Status: ACTIVE | COMMUNITY

## 2024-12-10 RX ORDER — ROSUVASTATIN CALCIUM 5 MG/1
TAKE ONE TABLET BY MOUTH ONE TIME DAILY TABLET, FILM COATED ORAL
Qty: 90 UNSPECIFIED | Refills: 0 | Status: ACTIVE | COMMUNITY

## 2024-12-10 RX ORDER — AMLODIPINE BESYLATE 2.5 MG/1
TAKE ONE TABLET BY MOUTH ONE TIME DAILY TABLET ORAL
Qty: 90 UNSPECIFIED | Refills: 0 | Status: ON HOLD | COMMUNITY

## 2024-12-10 RX ORDER — OMEPRAZOLE 40 MG/1
TAKE ONE CAPSULE BY MOUTH EVERY EVENING CAPSULE, DELAYED RELEASE ORAL
Qty: 30 UNSPECIFIED | Refills: 0 | Status: ON HOLD | COMMUNITY

## 2024-12-10 RX ORDER — LOSARTAN POTASSIUM 25 MG/1
TAKE ONE TABLET BY MOUTH ONE TIME DAILY TABLET, FILM COATED ORAL
Qty: 90 UNSPECIFIED | Refills: 0 | Status: ACTIVE | COMMUNITY

## 2024-12-10 NOTE — HPI-TODAY'S VISIT:
Pt here for evaluation of colon cancer . Pt had a colon in early 2024 with Dr. Read with colon mass but bx were negative Repeat colon at [facility] in 2024 also with no evidence of cancer . pt ultimately had a SBO with resection by Dr. harrell with partial colectomy and Stage 1 colon cancer s/p resection and chemo . pt with hx/o breast cancer . pt follows with Dr. Taveras 2024 I reviewed CT a/p with some indeterminate mesenteric lymph nodes 2024 I reviewedd CBc, BMP, lfts . Pt now here for evaluation. She needs a 1 year f/u colonoscopy. She is agreeable. No Gi sx such as weight changes, abd pain, gerd or bowel changes or bleeding.  .

## 2025-01-08 ENCOUNTER — OFFICE VISIT (OUTPATIENT)
Dept: URBAN - METROPOLITAN AREA CLINIC 9 | Facility: CLINIC | Age: 80
End: 2025-01-08

## 2025-01-08 ENCOUNTER — TELEPHONE ENCOUNTER (OUTPATIENT)
Dept: URBAN - METROPOLITAN AREA CLINIC 9 | Facility: CLINIC | Age: 80
End: 2025-01-08

## 2025-01-09 ENCOUNTER — WEB ENCOUNTER (OUTPATIENT)
Dept: URBAN - METROPOLITAN AREA CLINIC 9 | Facility: CLINIC | Age: 80
End: 2025-01-09

## 2025-01-10 ENCOUNTER — WEB ENCOUNTER (OUTPATIENT)
Dept: URBAN - METROPOLITAN AREA CLINIC 9 | Facility: CLINIC | Age: 80
End: 2025-01-10

## 2025-01-10 RX ORDER — SODIUM, POTASSIUM,MAG SULFATES 17.5-3.13G
AS DIRECTED SOLUTION, RECONSTITUTED, ORAL ORAL
Qty: 1 | Refills: 0 | OUTPATIENT
Start: 2025-01-10 | End: 2025-01-12

## 2025-01-14 ENCOUNTER — WEB ENCOUNTER (OUTPATIENT)
Dept: URBAN - METROPOLITAN AREA CLINIC 9 | Facility: CLINIC | Age: 80
End: 2025-01-14

## 2025-01-21 ENCOUNTER — CLAIMS CREATED FROM THE CLAIM WINDOW (OUTPATIENT)
Dept: URBAN - METROPOLITAN AREA SURGERY CENTER 9 | Facility: SURGERY CENTER | Age: 80
End: 2025-01-21
Payer: MEDICARE

## 2025-01-21 ENCOUNTER — CLAIMS CREATED FROM THE CLAIM WINDOW (OUTPATIENT)
Dept: URBAN - METROPOLITAN AREA CLINIC 4 | Facility: CLINIC | Age: 80
End: 2025-01-21
Payer: MEDICARE

## 2025-01-21 DIAGNOSIS — K57.30 DIVERTICULOSIS OF LARGE INTESTINE WITHOUT PERFORATION OR ABSCESS WITHOUT BLEEDING: ICD-10-CM

## 2025-01-21 DIAGNOSIS — K64.1 SECOND DEGREE HEMORRHOIDS: ICD-10-CM

## 2025-01-21 DIAGNOSIS — Z98.0 INTESTINAL BYPASS AND ANASTOMOSIS STATUS: ICD-10-CM

## 2025-01-21 DIAGNOSIS — K52.89 OTHER SPECIFIED NONINFECTIVE GASTROENTERITIS AND COLITIS: ICD-10-CM

## 2025-01-21 DIAGNOSIS — Z98.0 INTESTINAL ANASTOMOSIS PRESENT: ICD-10-CM

## 2025-01-21 DIAGNOSIS — K57.30 DIVERTICULOSIS OF COLON: ICD-10-CM

## 2025-01-21 DIAGNOSIS — Z08 ENCOUNTER FOR FOLLOW-UP SURVEILLANCE OF COLON CANCER: ICD-10-CM

## 2025-01-21 DIAGNOSIS — K64.8 OTHER HEMORRHOIDS: ICD-10-CM

## 2025-01-21 DIAGNOSIS — Z85.038 PERSONAL HISTORY OF COLON CANCER: ICD-10-CM

## 2025-01-21 DIAGNOSIS — Z85.038 PERSONAL HISTORY OF OTHER MALIGNANT NEOPLASM OF LARGE INTESTINE: ICD-10-CM

## 2025-01-21 DIAGNOSIS — Z86.0100 PERSONAL HISTORY OF COLONIC POLYPS: ICD-10-CM

## 2025-01-21 PROCEDURE — 45380 COLONOSCOPY AND BIOPSY: CPT | Performed by: INTERNAL MEDICINE

## 2025-01-21 PROCEDURE — 45380 COLONOSCOPY AND BIOPSY: CPT | Performed by: CLINIC/CENTER

## 2025-01-21 PROCEDURE — 00811 ANES LWR INTST NDSC NOS: CPT | Performed by: NURSE ANESTHETIST, CERTIFIED REGISTERED

## 2025-01-21 PROCEDURE — 88305 TISSUE EXAM BY PATHOLOGIST: CPT | Performed by: PATHOLOGY

## 2025-01-21 RX ORDER — LOSARTAN POTASSIUM 25 MG/1
TAKE ONE TABLET BY MOUTH ONE TIME DAILY TABLET, FILM COATED ORAL
Qty: 90 UNSPECIFIED | Refills: 0 | Status: ACTIVE | COMMUNITY

## 2025-01-21 RX ORDER — ANASTROZOLE 1 MG/1
TAKE ONE TABLET BY MOUTH ONE TIME DAILY TABLET ORAL
Qty: 90 UNSPECIFIED | Refills: 1 | Status: ACTIVE | COMMUNITY

## 2025-01-21 RX ORDER — OMEPRAZOLE 40 MG/1
TAKE ONE CAPSULE BY MOUTH EVERY EVENING CAPSULE, DELAYED RELEASE ORAL
Qty: 30 UNSPECIFIED | Refills: 0 | Status: ON HOLD | COMMUNITY

## 2025-01-21 RX ORDER — AMLODIPINE BESYLATE 2.5 MG/1
TAKE ONE TABLET BY MOUTH ONE TIME DAILY TABLET ORAL
Qty: 90 UNSPECIFIED | Refills: 0 | Status: ON HOLD | COMMUNITY

## 2025-01-21 RX ORDER — SERTRALINE 100 MG/1
1 TABLET TABLET, FILM COATED ORAL ONCE A DAY
Status: ACTIVE | COMMUNITY

## 2025-01-21 RX ORDER — METHOTREXATE 2.5 MG/1
TAKE EIGHT TABLETS BY MOUTH EVERY WEEK FOR 12 WEEKS TABLET ORAL
Qty: 97 UNSPECIFIED | Refills: 0 | Status: ON HOLD | COMMUNITY

## 2025-01-21 RX ORDER — ROSUVASTATIN CALCIUM 5 MG/1
TAKE ONE TABLET BY MOUTH ONE TIME DAILY TABLET, FILM COATED ORAL
Qty: 90 UNSPECIFIED | Refills: 0 | Status: ACTIVE | COMMUNITY

## 2025-06-23 ENCOUNTER — TELEPHONE ENCOUNTER (OUTPATIENT)
Dept: URBAN - METROPOLITAN AREA CLINIC 9 | Facility: CLINIC | Age: 80
End: 2025-06-23

## 2025-06-30 ENCOUNTER — OFFICE VISIT (OUTPATIENT)
Dept: URBAN - METROPOLITAN AREA CLINIC 9 | Facility: CLINIC | Age: 80
End: 2025-06-30
Payer: MEDICARE

## 2025-06-30 DIAGNOSIS — Z85.038 HISTORY OF COLON CANCER: ICD-10-CM

## 2025-06-30 DIAGNOSIS — R14.3 EXCESSIVE GAS: ICD-10-CM

## 2025-06-30 DIAGNOSIS — K52.9 COLITIS: ICD-10-CM

## 2025-06-30 PROCEDURE — 99214 OFFICE O/P EST MOD 30 MIN: CPT | Performed by: PHYSICIAN ASSISTANT

## 2025-06-30 RX ORDER — ANASTROZOLE 1 MG/1
TAKE ONE TABLET BY MOUTH ONE TIME DAILY TABLET ORAL
Qty: 90 UNSPECIFIED | Refills: 1 | Status: ACTIVE | COMMUNITY

## 2025-06-30 RX ORDER — AMLODIPINE BESYLATE 2.5 MG/1
TAKE ONE TABLET BY MOUTH ONE TIME DAILY TABLET ORAL
Qty: 90 UNSPECIFIED | Refills: 0 | Status: ON HOLD | COMMUNITY

## 2025-06-30 RX ORDER — OMEPRAZOLE 40 MG/1
TAKE ONE CAPSULE BY MOUTH EVERY EVENING CAPSULE, DELAYED RELEASE ORAL
Qty: 30 UNSPECIFIED | Refills: 0 | Status: ON HOLD | COMMUNITY

## 2025-06-30 RX ORDER — ROSUVASTATIN CALCIUM 5 MG/1
TAKE ONE TABLET BY MOUTH ONE TIME DAILY TABLET, FILM COATED ORAL
Qty: 90 UNSPECIFIED | Refills: 0 | Status: ACTIVE | COMMUNITY

## 2025-06-30 RX ORDER — LOSARTAN POTASSIUM 25 MG/1
TAKE ONE TABLET BY MOUTH ONE TIME DAILY TABLET, FILM COATED ORAL
Qty: 90 UNSPECIFIED | Refills: 0 | Status: ON HOLD | COMMUNITY

## 2025-06-30 RX ORDER — METHOTREXATE 2.5 MG/1
TAKE EIGHT TABLETS BY MOUTH EVERY WEEK FOR 12 WEEKS TABLET ORAL
Qty: 97 UNSPECIFIED | Refills: 0 | Status: ON HOLD | COMMUNITY

## 2025-06-30 RX ORDER — SERTRALINE 100 MG/1
1 TABLET TABLET, FILM COATED ORAL ONCE A DAY
Status: ACTIVE | COMMUNITY

## 2025-06-30 NOTE — HPI-TODAY'S VISIT:
Pt here for evaluation of colon cancer . Pt had a colon in early 2024 with Dr. Read with colon mass but bx were negative Repeat colon at [facility] in 2024 also with no evidence of cancer . pt ultimately had a SBO with resection by Dr. harrell with partial colectomy and Stage 1 colon cancer s/p resection and chemo . pt with hx/o breast cancer pt follows with Dr. Taveras  ENDOS: 1/2025 colonoscopy IH, diverticulosis, and 2 side ileocolonic anastomosis with erythema.  Path with focal active colitis with erosion, pattern nonspecific likely related to anastomosis site related injury.  Repeat 3 years.  Diagnostics:  2024 I reviewed CT a/p with some indeterminate mesenteric lymph nodes 2024 I reviewedd CBC, BMP, lfts 2/2025 labs H/H 12.8/39, platelets-NL, LFTs-NL.  CEA 4.1. 6/2025 CEA elevated 7.9, H/H-NL, LFTs-NL 6/2025 - CT a/p/chest - no now findings, no metastic disese.  Interim visit 6/30/2025 79-year-old patient diagnosed with colon cancer 2024, status post resection and chemo.  Has been followed since by [facility]. CEA up from 2/2024 which has increased to 7.9.   Was advised to have repeat colonoscopy this year and is now due.  Presents today for scheduling. No Gi sx such or weight changes, abd pain, gerd or bowel changes or bleeding.   Appetite is good.   She just had colonoscopy 1/2025.   Will talk with Dr. Chand.. She is also having some bloating and gas.  Recent CT with no alarming findings.  No new metastatic disease.  Will check a SIBO breath test.  Could try Gas X or peppermint oil.

## 2025-07-08 ENCOUNTER — TELEPHONE ENCOUNTER (OUTPATIENT)
Dept: URBAN - METROPOLITAN AREA CLINIC 9 | Facility: CLINIC | Age: 80
End: 2025-07-08

## 2025-07-14 ENCOUNTER — TELEPHONE ENCOUNTER (OUTPATIENT)
Dept: URBAN - METROPOLITAN AREA CLINIC 9 | Facility: CLINIC | Age: 80
End: 2025-07-14

## 2025-07-21 ENCOUNTER — OFFICE VISIT (OUTPATIENT)
Dept: URBAN - METROPOLITAN AREA CLINIC 9 | Facility: CLINIC | Age: 80
End: 2025-07-21

## 2025-07-25 ENCOUNTER — TELEPHONE ENCOUNTER (OUTPATIENT)
Dept: URBAN - METROPOLITAN AREA CLINIC 9 | Facility: CLINIC | Age: 80
End: 2025-07-25

## 2025-07-29 ENCOUNTER — FOLLOW UP (OUTPATIENT)
Age: 80
End: 2025-07-29

## 2025-07-29 DIAGNOSIS — H57.11: ICD-10-CM

## 2025-07-29 PROCEDURE — 99213 OFFICE O/P EST LOW 20 MIN: CPT
